# Patient Record
Sex: FEMALE | Race: WHITE
[De-identification: names, ages, dates, MRNs, and addresses within clinical notes are randomized per-mention and may not be internally consistent; named-entity substitution may affect disease eponyms.]

---

## 2017-03-10 ENCOUNTER — HOSPITAL ENCOUNTER (OUTPATIENT)
Dept: HOSPITAL 5 - US | Age: 42
Discharge: HOME | End: 2017-03-10
Attending: INTERNAL MEDICINE
Payer: COMMERCIAL

## 2017-03-10 DIAGNOSIS — R79.89: Primary | ICD-10-CM

## 2017-03-10 LAB
ALBUMIN SERPL-MCNC: 4.3 G/DL (ref 3.9–5)
ALBUMIN/GLOB SERPL: 1.5 %
ALP SERPL-CCNC: 111 UNITS/L (ref 35–129)
ALT SERPL-CCNC: 120 UNITS/L (ref 7–56)
ANION GAP SERPL CALC-SCNC: 16 MMOL/L
BASOPHILS NFR BLD AUTO: 0.6 % (ref 0–1.8)
BILIRUB SERPL-MCNC: 0.3 MG/DL (ref 0.1–1.2)
BUN SERPL-MCNC: 6 MG/DL (ref 7–17)
BUN/CREAT SERPL: 7.5 %
CALCIUM SERPL-MCNC: 9.3 MG/DL (ref 8.4–10.2)
CHLORIDE SERPL-SCNC: 100.9 MMOL/L (ref 98–107)
CO2 SERPL-SCNC: 26 MMOL/L (ref 22–30)
EOSINOPHIL NFR BLD AUTO: 0.8 % (ref 0–4.3)
GLUCOSE SERPL-MCNC: 104 MG/DL (ref 65–100)
HCT VFR BLD CALC: 44.8 % (ref 30.3–42.9)
HGB BLD-MCNC: 14.9 GM/DL (ref 10.1–14.3)
MCH RBC QN AUTO: 33 PG (ref 28–32)
MCHC RBC AUTO-ENTMCNC: 33 % (ref 30–34)
MCV RBC AUTO: 99 FL (ref 79–97)
PLATELET # BLD: 246 K/MM3 (ref 140–440)
POTASSIUM SERPL-SCNC: 4.5 MMOL/L (ref 3.6–5)
PROT SERPL-MCNC: 7.2 G/DL (ref 6.3–8.2)
RBC # BLD AUTO: 4.52 M/MM3 (ref 3.65–5.03)
SODIUM SERPL-SCNC: 138 MMOL/L (ref 137–145)
WBC # BLD AUTO: 11.1 K/MM3 (ref 4.5–11)

## 2017-03-10 PROCEDURE — 85025 COMPLETE CBC W/AUTO DIFF WBC: CPT

## 2017-03-10 PROCEDURE — 80053 COMPREHEN METABOLIC PANEL: CPT

## 2017-03-10 PROCEDURE — 36415 COLL VENOUS BLD VENIPUNCTURE: CPT

## 2017-03-10 PROCEDURE — 76705 ECHO EXAM OF ABDOMEN: CPT

## 2017-03-10 NOTE — ULTRASOUND REPORT
Sonogram right upper quadrant:



Findings:



Normal aorta. Normal liver. No intrahepatic or extrahepatic duct 

dilatation. Common bile duct diameter 3.7 mm. Gallbladder wall 

thickness 2.3 mm. No calculi the gallbladder. No pericholecystic fluid.



Right kidney 12.3 x 3.2 x 5.1 cm. Cortical thickness 1.1 cm. There is 

suspicion of a mass noted in the renal sinus region measuring 

approximately 4.4 x 1.9 x 2.6 cm.



Normal pancreas.



Impression:



No calculi in the gallbladder.

Suspicion of a mass right kidney. Recommend CT scan for further 

evaluation.

## 2020-10-15 ENCOUNTER — HOSPITAL ENCOUNTER (EMERGENCY)
Dept: HOSPITAL 5 - ED | Age: 45
Discharge: LEFT BEFORE DISCHARGE | End: 2020-10-15
Payer: COMMERCIAL

## 2020-10-15 VITALS — SYSTOLIC BLOOD PRESSURE: 123 MMHG | DIASTOLIC BLOOD PRESSURE: 70 MMHG

## 2020-10-15 DIAGNOSIS — M79.604: Primary | ICD-10-CM

## 2020-10-15 DIAGNOSIS — Z53.21: ICD-10-CM
